# Patient Record
Sex: MALE | Race: WHITE | NOT HISPANIC OR LATINO | Employment: OTHER
[De-identification: names, ages, dates, MRNs, and addresses within clinical notes are randomized per-mention and may not be internally consistent; named-entity substitution may affect disease eponyms.]

---

## 2022-02-14 ENCOUNTER — TRANSCRIBE ORDERS (OUTPATIENT)
Dept: OTHER | Age: 87
End: 2022-02-14

## 2022-02-14 DIAGNOSIS — H91.90 UNSPECIFIED HEARING LOSS, UNSPECIFIED EAR: Primary | ICD-10-CM

## 2022-04-25 NOTE — PROGRESS NOTES
AUDIOLOGY REPORT    SUBJECTIVE: J Carlos Singletary is a 86 year old male who was seen in the Audiology Clinic at the [facility] for audiologic evaluation, referred by, [facility], Lamont Muniz.  They were accompanied today by their wife Zulay. The patient reports longstanding hearing loss in both ears. He is a current [employer] WICHO user, patient unsure how long he has had this set of hearing aids. Brought in a few different sets of hearing aids with him today, reports none are working. He is unsure if the hearing aids are more than three years old. Per [facility] protocol new hearing aids can be ordered without additional authorization once past the three year warranty.  Longstanding bilateral tinnitus. Occasional aural fullness, denied recent fullness. 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 Denied otalgia, otorrhea, dizziness, and history of ear surgery.    OBJECTIVE:  Abuse Screening:  Do you feel unsafe at home or work/school? No  Do you feel threatened by someone? No  Does anyone try to keep you from having contact with others, or doing things outside of your home? No  Physical signs of abuse present? No     Fall Risk Screen:  1. Have you fallen two or more times in the past year? No  2. Have you fallen and had an injury in the past year? No    Timed Up and Go Score (in seconds): not tested  Is patient a fall risk? No  Referral initiated: No  Fall Risk Assessment Completed by Audiology    Otoscopic exam indicates ears are clear of cerumen bilaterally.    Pure Tone Thresholds assessed using conventional audiometry with good reliability from 250-8000 Hz bilaterally using insert earphones and circumaural headphones. Significant improvement in thresholds when switched to inserts.    RIGHT: Normal sloping to a profound sensorineural hearing loss      LEFT:   Normal sloping to a profound sensorineural hearing loss    Tympanogram:    RIGHT: Normal  eardrum mobility    LEFT:   Normal eardrum mobility    Reflexes (reported by stimulus ear):  RIGHT: Ipsilateral is absent at frequencies tested  RIGHT: Contralateral is present at frequencies tested  LEFT:   Ipsilateral is present at elevated levels  LEFT:   Contralateral is present at elevated levels    Speech Reception Threshold:    RIGHT: 55 dB HL    LEFT:   50 dB HL    Word Recognition Score: Circumaural headphones    RIGHT: 32% at 95 dB HL using NU-6 recorded word list.    LEFT:   12% at 95 dB HL using NU-6 recorded word list.    Word Recognition Score: Insert earphones     RIGHT: 32% at 95 dB HL using NU-6 recorded word list.    LEFT:   24% at 95 dB HL using NU-6 recorded word list.    Significant improvement in pure tone thresholds with  inserts. Retested SRT and Word rec with inserts.    ASSESSMENT: Today's results reveal normal sloping to a profound sensorineural hearing loss bilaterally. Today s results were discussed with the patient and his wife in detail.     Faxed results to [facility] per protocol.   Submitted request for hearing aid history through the [facility] portal. Salvatore's [facility] ICN number is [id number].     Did clean and check patient's hearing aids. Replaced wax traps and domes, as visual inspection showed they were completely occluded with wax. Vacuumed microphone ports as well. Listening check revealed proper function of the hearing aids. When hearing aids were returned to patient he reported he could hear again.     PLAN: Salvatore is scheduled to return 6/9/2022 for a hearing aid consult, will wait for authorization from the [facility] to pursue ordering new hearing aids.    Once patient left called and spoke with a representative out of the [place] office who stated that if the 's current hearing aids are working and in warranty they more than likely they will not be approved for new hearing aids. Representative reported that starting with requesting the [place]'s hearing aid history  was the first step that needs to be taken. A request through the [facility] portal for audiogram and hearing aid history was placed.   Hearing aid history for Salvatore was submitted by the [facility] though the portal which showed he was issued binaural Audio P90 WICHO hearing aids 08/26/2021, with warranty expiring 08/25/2024.     Called patient and let his wife Zulay know that the hearing aid consult appointment on 6/9/222 will need to be canceled as he is not eligible for new hearing aids trough the [facility] at this time since his current pair of hearing aids are working, less than one year old, and in warranty. Zulay was pleased with the information, and will check with the [facility] for future hearing aid clean and check appointments. She stated the hearing aids that were cleaned during the appointment were still working well.     Recommend annual audio to monitor hearing. Please call this clinic with questions regarding these results or recommendations.    Rubina Abreu., CCC-A  [place] Licensed Audiologist #[id number]

## 2022-04-28 ENCOUNTER — OFFICE VISIT (OUTPATIENT)
Dept: AUDIOLOGY | Facility: CLINIC | Age: 87
End: 2022-04-28
Payer: COMMERCIAL

## 2022-04-28 DIAGNOSIS — H91.90 UNSPECIFIED HEARING LOSS, UNSPECIFIED EAR: ICD-10-CM

## 2022-04-28 DIAGNOSIS — H90.3 SENSORINEURAL HEARING LOSS, BILATERAL: Primary | ICD-10-CM

## 2022-04-28 PROCEDURE — 92593 PR HEARING AID CHECK, BINAURAL: CPT | Performed by: AUDIOLOGIST

## 2022-04-28 PROCEDURE — 99207 PR NO CHARGE LOS: CPT | Performed by: AUDIOLOGIST

## 2022-04-28 PROCEDURE — 92550 TYMPANOMETRY & REFLEX THRESH: CPT | Performed by: AUDIOLOGIST

## 2022-04-28 PROCEDURE — 92557 COMPREHENSIVE HEARING TEST: CPT | Performed by: AUDIOLOGIST

## 2022-06-18 ENCOUNTER — HEALTH MAINTENANCE LETTER (OUTPATIENT)
Age: 87
End: 2022-06-18

## 2022-10-30 ENCOUNTER — HEALTH MAINTENANCE LETTER (OUTPATIENT)
Age: 87
End: 2022-10-30

## 2023-06-01 ENCOUNTER — HEALTH MAINTENANCE LETTER (OUTPATIENT)
Age: 88
End: 2023-06-01

## 2024-01-01 ENCOUNTER — HEALTH MAINTENANCE LETTER (OUTPATIENT)
Age: 89
End: 2024-01-01